# Patient Record
Sex: FEMALE | URBAN - METROPOLITAN AREA
[De-identification: names, ages, dates, MRNs, and addresses within clinical notes are randomized per-mention and may not be internally consistent; named-entity substitution may affect disease eponyms.]

---

## 2019-01-08 ENCOUNTER — APPOINTMENT (RX ONLY)
Dept: URBAN - METROPOLITAN AREA CLINIC 137 | Facility: CLINIC | Age: 34
Setting detail: DERMATOLOGY
End: 2019-01-08

## 2019-01-08 VITALS
RESPIRATION RATE: 16 BRPM | WEIGHT: 135 LBS | HEIGHT: 67 IN | SYSTOLIC BLOOD PRESSURE: 117 MMHG | DIASTOLIC BLOOD PRESSURE: 62 MMHG | HEART RATE: 88 BPM

## 2019-01-08 DIAGNOSIS — E03.8 OTHER SPECIFIED HYPOTHYROIDISM: ICD-10-CM

## 2019-01-08 DIAGNOSIS — L63.8 OTHER ALOPECIA AREATA: ICD-10-CM

## 2019-01-08 PROBLEM — E05.90 THYROTOXICOSIS, UNSPECIFIED WITHOUT THYROTOXIC CRISIS OR STORM: Status: ACTIVE | Noted: 2019-01-08

## 2019-01-08 PROCEDURE — 99201: CPT | Mod: 25

## 2019-01-08 PROCEDURE — 11900 INJECT SKIN LESIONS </W 7: CPT

## 2019-01-08 PROCEDURE — ? INTRALESIONAL KENALOG

## 2019-01-08 PROCEDURE — ? COUNSELING

## 2019-01-08 ASSESSMENT — LOCATION ZONE DERM: LOCATION ZONE: FACE

## 2019-01-08 ASSESSMENT — LOCATION DETAILED DESCRIPTION DERM: LOCATION DETAILED: LEFT SUPERIOR FOREHEAD

## 2019-01-08 ASSESSMENT — LOCATION SIMPLE DESCRIPTION DERM: LOCATION SIMPLE: LEFT FOREHEAD

## 2019-01-08 NOTE — PROCEDURE: INTRALESIONAL KENALOG
Kenalog Preparation: Kenalog
Include Z78.9 (Other Specified Conditions Influencing Health Status) As An Associated Diagnosis?: No
Concentration Of Kenalog Solution Injected (Mg/Ml): 0.3
Medical Necessity Clause: This procedure was medically necessary because the lesions that were treated were:
Consent: The risks of atrophy were reviewed with the patient.
Detail Level: Detailed
X Size Of Lesion In Cm (Optional): 0
Total Volume (Ccs): 3